# Patient Record
Sex: FEMALE | Race: WHITE | NOT HISPANIC OR LATINO | Employment: PART TIME | URBAN - METROPOLITAN AREA
[De-identification: names, ages, dates, MRNs, and addresses within clinical notes are randomized per-mention and may not be internally consistent; named-entity substitution may affect disease eponyms.]

---

## 2017-01-16 ENCOUNTER — ALLSCRIPTS OFFICE VISIT (OUTPATIENT)
Dept: OTHER | Facility: OTHER | Age: 27
End: 2017-01-16

## 2017-01-16 DIAGNOSIS — F41.8 OTHER SPECIFIED ANXIETY DISORDERS: ICD-10-CM

## 2017-01-16 DIAGNOSIS — E28.2 POLYCYSTIC OVARIAN SYNDROME: ICD-10-CM

## 2017-01-31 ENCOUNTER — ALLSCRIPTS OFFICE VISIT (OUTPATIENT)
Dept: OTHER | Facility: OTHER | Age: 27
End: 2017-01-31

## 2017-02-03 ENCOUNTER — GENERIC CONVERSION - ENCOUNTER (OUTPATIENT)
Dept: OTHER | Facility: OTHER | Age: 27
End: 2017-02-03

## 2017-02-15 ENCOUNTER — ALLSCRIPTS OFFICE VISIT (OUTPATIENT)
Dept: OTHER | Facility: OTHER | Age: 27
End: 2017-02-15

## 2017-03-09 ENCOUNTER — ALLSCRIPTS OFFICE VISIT (OUTPATIENT)
Dept: OTHER | Facility: OTHER | Age: 27
End: 2017-03-09

## 2017-03-09 DIAGNOSIS — Z31.41 ENCOUNTER FOR FERTILITY TESTING: ICD-10-CM

## 2017-03-09 DIAGNOSIS — N92.6 IRREGULAR MENSTRUATION: ICD-10-CM

## 2017-03-09 DIAGNOSIS — E28.2 POLYCYSTIC OVARIAN SYNDROME: ICD-10-CM

## 2017-03-21 LAB — PROGESTERONE LEVEL (HISTORICAL): <0.5 NG/ML

## 2017-04-18 ENCOUNTER — ALLSCRIPTS OFFICE VISIT (OUTPATIENT)
Dept: OTHER | Facility: OTHER | Age: 27
End: 2017-04-18

## 2017-08-02 ENCOUNTER — ALLSCRIPTS OFFICE VISIT (OUTPATIENT)
Dept: OTHER | Facility: OTHER | Age: 27
End: 2017-08-02

## 2017-10-31 ENCOUNTER — ALLSCRIPTS OFFICE VISIT (OUTPATIENT)
Dept: OTHER | Facility: OTHER | Age: 27
End: 2017-10-31

## 2017-10-31 ENCOUNTER — LAB REQUISITION (OUTPATIENT)
Dept: LAB | Facility: HOSPITAL | Age: 27
End: 2017-10-31
Payer: COMMERCIAL

## 2017-10-31 DIAGNOSIS — Z01.419 ENCOUNTER FOR GYNECOLOGICAL EXAMINATION WITHOUT ABNORMAL FINDING: ICD-10-CM

## 2017-10-31 PROCEDURE — G0145 SCR C/V CYTO,THINLAYER,RESCR: HCPCS | Performed by: OBSTETRICS & GYNECOLOGY

## 2017-11-01 NOTE — PROGRESS NOTES
Assessment  1  Encounter for routine gynecological examination (V72 31) (Z01 419)   2  Pap smear, as part of routine gynecological examination (V76 2) (Z01 419)   3  Polycystic ovarian syndrome (256 4) (E28 2)   4  Irregular menstrual bleeding (626 4) (N92 6)    Plan  Irregular menstrual bleeding    · Follow-up visit in 4 Months Evaluation and Treatment  Follow-up  Status: Hold For -  Scheduling  Requested for: 49RYQ1142   Ordered; For: Irregular menstrual bleeding; Ordered By: Hasmukh Moss Performed:  Due: 26JHC9351  Pap smear, as part of routine gynecological examination    · (1) THIN PREP PAP WITH IMAGING; Status: In Progress - Specimen/Data  Collected,Retrospective By Protocol Authorization;   Done: 03QLZ7284   Perform:[facility] In Office Collection; Order Comments:cervical, endocervical; Due:31Oct2018; Last Updated Fausto Denny; 10/31/2017 2:49:56 PM;Ordered; For:Pap smear, as part of routine gynecological examination; Ordered By:Tiffany Simmons; Maturation index required? : No  : 10/2017  HPV? : if ASCUS    Discussion/Summary  healthy adult female Currently, she eats a healthy diet  cervical cancer screening is current Pap test was done today Breast cancer screening: monthly self breast exam was advised  Advice and education were given regarding nutrition, aerobic exercise, calcium supplements, vitamin D supplements and sunscreen use  Normal GYN Exam  Stressed  ordered  SMear DOne  start on Low Dose BCP for period management  visit in 3 -4 months for evaluation    GYN Exam in 1 year  if any problems develop during the interim    The patient has the current Goals: [address]  The patent has the current Barriers: None  Patient is able to Self-Care  PATIENT EDUCATION RECORD She was given the following educational materials:  Ideas for a Healthy Life Style  The treatment plan was reviewed with the patient/guardian   The patient/guardian understands and agrees with the treatment plan Self Referrals: No      Chief Complaint  ANNUAL EXAM c/o irregular cycles      History of Present Illness  HPI: Periods are IRREGULAR Unpredictable when coming Probable PCOS C/o fatigue    Normal Bowel & Bladder habits  pelvic or abdominal pain   GYN , Adult Female [facility]: The patient is being seen for a gynecology evaluation  The last health maintenance visit was 1 year(s) ago  General Health: The patient's health since the last visit is described as good  She has regular dental visits  -- She denies vision problems  -- She denies hearing loss  Lifestyle:  She consumes a diverse and healthy diet  -- She does not have any weight concerns  -- She exercises regularly  -- She does not use tobacco -- She denies drug use  Reproductive health: the patient is premenopausal    Screening: cancer screening reviewed and current  metabolic screening reviewed and current  risk screening reviewed and current  Review of Systems    Constitutional: No fever, no chills, feels well, no tiredness, no recent weight gain or loss  ENT: no ear ache, no loss of hearing, no nosebleeds or nasal discharge, no sore throat or hoarseness  Cardiovascular: no complaints of slow or fast heart rate, no chest pain, no palpitations, no leg claudication or lower extremity edema  Respiratory: no complaints of shortness of breath, no wheezing, no dyspnea on exertion, no orthopnea or PND  Breasts: no complaints of breast pain, breast lump or nipple discharge  Gastrointestinal: no complaints of abdominal pain, no constipation, no nausea or diarrhea, no vomiting, no bloody stools  Genitourinary: no complaints of dysuria, no incontinence, no pelvic pain, no dysmenorrhea, no vaginal discharge or abnormal vaginal bleeding  Musculoskeletal: no complaints of arthralgia, no myalgia, no joint swelling or stiffness, no limb pain or swelling  Integumentary: no complaints of skin rash or lesion, no itching or dry skin, no skin wounds  Neurological: no complaints of headache, no confusion, no numbness or tingling, no dizziness or fainting  Active Problems  1  Allergic dermatitis (692 9) (L23 9)   2  Denied: History of Auditory hallucinations   3  Depression with anxiety (300 4) (F41 8)   4  Encounter for routine gynecological examination (V72 31) (Z01 419)   5  Family planning advice (V25 09) (Z30 09)   6  Fertility testing (V26 21) (Z31 41)   7  Fibrocystic breast changes, unspecified laterality (610 1) (N60 19)   8  Irregular menstrual bleeding (626 4) (N92 6)   9  Pap smear, as part of routine gynecological examination (V76 2) (Z01 419)   10  Paranoia (297 1) (F22)   11   Polycystic ovarian syndrome (256 4) (E28 2)    Past Medical History   · History of Abnormal uterine bleeding (AUB) (626 9) (N93 9)   · History of Ankle Injury (959 7)   · Denied: History of Auditory hallucinations   · History of abnormal cervical Pap smear (V13 29) (O92 520)   · History of headache (V13 89) (K63 711)   · History of pregnancy (V13 29)   · History of sinusitis (V12 69) (Z87 09)   · History of upper respiratory infection (V12 09) (Z87 09)   · History of viral infection (V12 09) (Z86 19)   · History of Immune to varicella (V49 89) (Z78 9)   · History of Menarche (V21 8)   · History of Oral herpes (054 2) (B00 2)    Surgical History   · History of Oral Surgery Tooth Extraction    Family History  Mother    · Family history of malignant neoplasm of thyroid (V16 8) (Z80 8)  Grandfather    · Family history of pancreatic cancer (V16 0) (Z80 0)  Paternal Grandfather    · Family history of colon cancer (V16 0) (Z80 0)  Family History    · Family history of Depression (311) (F32 9)   · Family history of breast disorder (V19 8) (Z84 2)   · Family history of malignant neoplasm (V16 9) (Z80 9)   · Family history of thyroid disease (V18 19) (Z80 46)    Social History   · Education history   · HIgh school graduate, Trade school certificate   · Exercise habits   · 2-3x/week   · Denied: History of drug use   ·    · Never A Smoker   · Occupation   ·    · [employer]   · Rarely consumes alcohol (V49 89) (Z78 9)    Current Meds   1  Escitalopram Oxalate 10 MG Oral Tablet; TAKE  1  TABLET Daily; Therapy: 19BKI1503 to (Evaluate:17Rbj8023)  Requested for: 88TJB0522; Last   Rx:29Ljy0723 Ordered    Allergies  1  Penicillins    Vitals   Recorded: 41HVD6046 44:83CD   Systolic 118   Diastolic 64   Height 5 ft 6 5 in   Weight 226 lb    BMI Calculated 35 93   BSA Calculated 2 12   LMP 01Oct2017     Physical Exam    Constitutional   General appearance: No acute distress, well appearing and well nourished  Neck   Neck: Normal, supple, trachea midline, no masses  Thyroid: Normal, no thyromegaly  Pulmonary   Respiratory effort: No increased work of breathing or signs of respiratory distress  Auscultation of lungs: Clear to auscultation  Cardiovascular   Auscultation of heart: Normal rate and rhythm, normal S1 and S2, no murmurs  Peripheral vascular exam: Normal pulses Throughout  Genitourinary   External genitalia: Normal and no lesions appreciated  Vagina: Normal, no lesions or dryness appreciated  Urethra: Normal     Urethral meatus: Normal     Bladder: Normal, soft, non-tender and no prolapse or masses appreciated  Cervix: Normal, no palpable masses  Uterus: Normal, non-tender, not enlarged, and no palpable masses  Adnexa/parametria: Normal, non-tender and no fullness or masses appreciated  Chest   Breasts: Normal and no dimpling or skin changes noted  Abdomen   Abdomen: Normal, non-tender, and no organomegaly noted  Liver and spleen: No hepatomegaly or splenomegaly  Examination for hernias: No hernias appreciated  Lymphatic   Palpation of lymph nodes in neck, axillae, groin and/or other locations: No lymphadenopathy or masses noted  Skin   Skin and subcutaneous tissue: Normal skin turgor and no rashes  Palpation of skin and subcutaneous tissue: Normal     Psychiatric   Orientation to person, place, and time: Normal     Mood and affect: Normal        Provider Comments  Provider Comments:   Wants to go to [facility] for Dental Hygiene      Signatures   Electronically signed by : DIONTE Morrison ; Oct 31 2017  3:44PM EST                       (Author)

## 2017-11-07 LAB
LAB AP GYN PRIMARY INTERPRETATION: NORMAL
Lab: NORMAL

## 2017-12-20 ENCOUNTER — GENERIC CONVERSION - ENCOUNTER (OUTPATIENT)
Dept: OTHER | Facility: OTHER | Age: 27
End: 2017-12-20

## 2017-12-27 ENCOUNTER — ALLSCRIPTS OFFICE VISIT (OUTPATIENT)
Dept: OTHER | Facility: OTHER | Age: 27
End: 2017-12-27

## 2017-12-28 NOTE — PROGRESS NOTES
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 or persist     Possible side effects of new medications were reviewed with the patient/guardian today  The treatment plan was reviewed with the patient/guardian  The patient/guardian understands and agrees with the treatment plan       Self Referrals: No      Chief Complaint   Patient had vomiting and diarrhea, she does feel better but needs a note to return to work      Advance Directives   Advance Directive [facility]:      NO - Patient does not have an advance health care directive  History of Present Illness   HPI: Pt presents today for an acute visit    vomiting and diarrhea which started yesterday morning  vomiting over night, once this morning having diarrhea - about 4 episodes this morning blood or mucus in stool  is nonbloody and nonbilious  eating much but is drinking fluids and keeping them down  lower abdominal cramping yesterday, resolved today  chills yesterday and felt feverish  around family members with similar symptoms over the holiday  rhinorrhea, nasal congestion, sore throat, coughing, headache, body aches      Review of Systems        Constitutional: chills-- and-- feeling tired, but-- as noted in HPI-- and-- no fever  ENT: no earache,-- no sore throat-- and-- no nasal discharge        Cardiovascular: no chest pain,-- no intermittent leg claudication,-- no palpitations-- and-- no lower extremity edema  Respiratory: no shortness of breath,-- no cough,-- no wheezing-- and-- no shortness of breath during exertion  Gastrointestinal: nausea,-- vomiting-- and-- diarrhea, but-- as noted in HPI,-- no abdominal pain-- and-- no constipation  Genitourinary: no dysuria  Musculoskeletal: no myalgias  Integumentary: no rashes  Neurological: no headache-- and-- no dizziness  ROS reviewed  Active Problems   1  Allergic dermatitis (692 9) (L23 9)   2  Denied: History of Auditory hallucinations   3  Depression with anxiety (300 4) (F41 8)   4  Encounter for routine gynecological examination (V72 31) (Z01 419)   5  Family planning advice (V25 09) (Z30 09)   6  Fertility testing (V26 21) (Z31 41)   7  Fibrocystic breast changes, unspecified laterality (610 1) (N60 19)   8  Irregular menstrual bleeding (626 4) (N92 6)   9  Pap smear, as part of routine gynecological examination (V76 2) (Z01 419)   10  Paranoia (297 1) (F22)   11  Polycystic ovarian syndrome (256 4) (E28 2)    Past Medical History   1  History of Abnormal uterine bleeding (AUB) (626 9) (N93 9)   2  History of Ankle Injury (959 7)   3  Denied: History of Auditory hallucinations   4  History of abnormal cervical Pap smear (V13 29) (Z87 898)   5  History of headache (V13 89) (Z87 898)   6  History of pregnancy (V13 29)   7  History of sinusitis (V12 69) (Z87 09)   8  History of upper respiratory infection (V12 09) (Z87 09)   9  History of viral infection (V12 09) (Z86 19)   10  History of Immune to varicella (V49 89) (Z78 9)   11  History of Menarche (V21 8)   12  History of Oral herpes (054 2) (B00 2)  Active Problems And Past Medical History Reviewed: The active problems and past medical history were reviewed and updated today  Family History   Mother    1  Family history of malignant neoplasm of thyroid (V16 8) (Z80 8)  Grandfather    2   Family history of pancreatic cancer (V16 0) (Z80 0)  Paternal Grandfather    3  Family history of colon cancer (V16 0) (Z80 0)  Family History    4  Family history of Depression (311) (F32 9)   5  Family history of breast disorder (V19 8) (Z84 2)   6  Family history of malignant neoplasm (V16 9) (Z80 9)   7  Family history of thyroid disease (V18 19) (Z80 46)    Social History    · Education history   · HIgh school graduate, Trade school certificate   · Exercise habits   · 2-3x/week   · Denied: History of drug use   ·    · Never A Smoker   · Occupation   · [address]   · 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   · Rarely consumes alcohol (V49 89) (Z78 9)  The social history was reviewed and updated today  The social history was reviewed and is unchanged  Surgical History   1  History of Oral Surgery Tooth Extraction    Current Meds    1  Escitalopram Oxalate 10 MG Oral Tablet; TAKE  1  TABLET Daily; Therapy: 50FJT5860 to [id number])  Requested for: 52NCO3182; Last     Rx:01Hmi4000 Ordered     The medication list was reviewed and updated today  Allergies   1  Penicillins    Vitals    Recorded: 82KFT8224 11:31AM   Temperature 97 3 F   Heart Rate 88   Respiration 20   Systolic 647   Diastolic 78   Height 5 ft 6 5 in   Weight 221 lb    BMI Calculated 35 14   BSA Calculated 2 1     Physical Exam        Constitutional      General appearance: No acute distress, well appearing and well nourished  Eyes      Conjunctiva and lids: No swelling, erythema or discharge  Pupils and irises: Equal, round and reactive to light  Ears, Nose, Mouth, and Throat      External inspection of ears and nose: Normal        Otoscopic examination: Tympanic membranes translucent with normal light reflex  Canals patent without erythema  Nasal mucosa, septum, and turbinates: Normal without edema or erythema  Oropharynx: Normal with no erythema, edema, exudate or lesions    Oral mucosa was moist, but-- was normal       Pulmonary      Respiratory effort: No increased work of breathing or signs of respiratory distress  Auscultation of lungs: Clear to auscultation  Cardiovascular      Auscultation of heart: Normal rate and rhythm, normal S1 and S2, without murmurs  Examination of extremities for edema and/or varicosities: Normal        Abdomen      Abdomen: Abnormal   The abdomen was obese  Bowel sounds were hyperactive  The abdomen was soft and nontender  The abdomen was not firm-- and-- not rigid  No rebound tenderness  No guarding  There was a negative Gould's sign-- and-- negative psoas sign  no masses palpated  The abdomen was normal to percussion-- and-- did not have ascites  Lymphatic      Palpation of lymph nodes in neck: No lymphadenopathy  Musculoskeletal      Gait and station: Normal        Skin      Skin and subcutaneous tissue: Normal without rashes or lesions  Psychiatric      Orientation to person, place, and time: Normal        Mood and affect: Normal           Attending Note   Collaborating Physician Note: Collaborating Note: I did not interview and examine the patient-- and-- I agree with the Advanced Practitioner note  Future Appointments      Date/Time Provider Specialty Site   01/23/2018 02:30 PM DIONTE Isaac  Obstetrics/Gynecology [place] [address] [facility]     Signatures    Electronically signed by : LACY Esquivel; Dec 27 2017 12:09PM EST                       (Author)     Electronically signed by :  Edwin Akhtar MD; Dec 27 2017 12:15PM EST                       (Author)

## 2018-01-10 NOTE — PSYCH
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 [employer]  Patient verbal and cooperative  HPI - Psych: Patient has had some increased anxiety associated with 's work travel/stressors  Has some stress in workplace but managing  Denies any depressive symptoms  No SI  Has history of paranoia and some auditory hallucinations  Has always managed in hr own outside of treatment  Details no history of hospitalization  Thoughts logical and goal-directed  Oriented to person, place and time   Note   Note:   Given her struggles and her family history, she agrees to referral for psychiatrist via [employer]  Completed referral  Provided my contact information if needed going forward  Feels she does not need counseling but will call if this changes  Assessment    1   Depression with anxiety (300 4) (F41 8)    Signatures   Electronically signed by : Rosalba Hernandez LCSW; Jan 31 2017  1:43PM EST                       (Author)

## 2018-01-11 NOTE — MISCELLANEOUS
Message  Spoke with patient regarding blood work and pelvic U/S results  Ovaries enlarged on U/S but no cysts  Per Dr Natasha Lamb, blood work suspicious for PCOS, particularly elevated testosterone  Explained PCOS to patient and answered her questions  Discussed possible treatment options including OCPs, and possibility of referral to fertility specialist  Encouraged patient to continue trying to conceive  Has follow-up appt in August Signatures   Electronically signed by : Juan Allen [facility];  Apr 22 2016 10:15AM EST                       (Author)

## 2018-01-13 VITALS
BODY MASS INDEX: 34.26 KG/M2 | HEIGHT: 67 IN | SYSTOLIC BLOOD PRESSURE: 120 MMHG | TEMPERATURE: 98.2 F | WEIGHT: 218.25 LBS | DIASTOLIC BLOOD PRESSURE: 60 MMHG | RESPIRATION RATE: 16 BRPM | HEART RATE: 76 BPM

## 2018-01-13 VITALS
WEIGHT: 226 LBS | BODY MASS INDEX: 35.47 KG/M2 | SYSTOLIC BLOOD PRESSURE: 116 MMHG | DIASTOLIC BLOOD PRESSURE: 64 MMHG | HEIGHT: 67 IN

## 2018-01-14 VITALS
BODY MASS INDEX: 34.57 KG/M2 | WEIGHT: 220.25 LBS | TEMPERATURE: 97.8 F | DIASTOLIC BLOOD PRESSURE: 84 MMHG | HEART RATE: 80 BPM | HEIGHT: 67 IN | RESPIRATION RATE: 16 BRPM | SYSTOLIC BLOOD PRESSURE: 122 MMHG

## 2018-01-14 VITALS
DIASTOLIC BLOOD PRESSURE: 78 MMHG | HEIGHT: 67 IN | BODY MASS INDEX: 35.44 KG/M2 | TEMPERATURE: 97.4 F | WEIGHT: 225.8 LBS | HEART RATE: 80 BPM | RESPIRATION RATE: 16 BRPM | SYSTOLIC BLOOD PRESSURE: 132 MMHG

## 2018-01-15 NOTE — PSYCH
Behavioral Health Outpatient Intake    Referred By: Isaiah Pablo  Intake Questions: there are no developmental disabilities  the patient does not have a hearing impairment  the patient does not have an ICM or CTT  patient is not taking injectable psychiatric medications  Employment: The patient is employed part time at [address]  Emergency Contact Information:   Emergency Contact: Rivka Prado   Relationship to Patient:    Phone Number: [phone]   Previous Psychiatric Treatment: She has not been previously seen by a psychiatrist     She has not been previously seen by a therapist     History: no  service  She has not had combat service  She was not activated into federal active duty as a member of the [employer] or [employer]  [facility]   : 3-88   Employer: [address] [place] [address]   Primary Insurance: [facility]   ID number: [id number]   Group number: [id number]         Presenting Problem (in patient's words): AXNIETY  Substance Abuse: NONE  Previous Treatment: The patient has not been seen here in the past      Accepted as Patient    [facility] @ 9:00     Primary Care Physician: [facility]       Signatures   Electronically signed by : Toyin Lucero, ; Feb  3 2017  8:59AM EST                       (Author)

## 2018-01-15 NOTE — PSYCH
Provider Comments  Provider Comments:   Patient was no-show for initial evaluation        Signatures   Electronically signed by : Adeline Catherine MD; Apr 18 2017  9:30AM EST                       (Author)

## 2018-01-16 ENCOUNTER — ALLSCRIPTS OFFICE VISIT (OUTPATIENT)
Dept: OTHER | Facility: OTHER | Age: 28
End: 2018-01-16

## 2018-01-16 NOTE — MISCELLANEOUS
Message  Spoke with patient regarding CBC - WNL  Patient states she started OCP the day of her visit on 5/12, and bleeding stopped  No issues since  Has appt for follow-up on 8/4        Signatures   Electronically signed by : Peter Lr, [facility]; May 19 2016  2:34PM EST                       (Author)

## 2018-01-17 NOTE — PROGRESS NOTES
Chief Complaint   Chief Complaint Free Text Note Form: Contraceptive Visit f/u feel very comfortable with her b/c,period are super regular and light      History of Present Illness   HPI: BCP CHECK-UP     Patient doing very well on the BCP     No abnormal symptoms noted     Periods are very REGULAR No abnormal bleeding noted     Denies any significant cramping either     Very Happy! Review of Systems   Focused-Female:      Constitutional: No fever, no chills, feels well, no tiredness, no recent weight gain or loss  ENT: no ear ache, no loss of hearing, no nosebleeds or nasal discharge, no sore throat or hoarseness  Cardiovascular: no complaints of slow or fast heart rate, no chest pain, no palpitations, no leg claudication or lower extremity edema  Respiratory: no complaints of shortness of breath, no wheezing, no dyspnea on exertion, no orthopnea or PND  Breasts: no complaints of breast pain, breast lump or nipple discharge  Gastrointestinal: no complaints of abdominal pain, no constipation, no nausea or diarrhea, no vomiting, no bloody stools  Genitourinary: no complaints of dysuria, no incontinence, no pelvic pain, no dysmenorrhea, no vaginal discharge or abnormal vaginal bleeding  Musculoskeletal: no complaints of arthralgia, no myalgia, no joint swelling or stiffness, no limb pain or swelling  Integumentary: no complaints of skin rash or lesion, no itching or dry skin, no skin wounds  Neurological: no complaints of headache, no confusion, no numbness or tingling, no dizziness or fainting  Active Problems   1  Allergic dermatitis (692 9) (L23 9)   2  Denied: History of Auditory hallucinations   3  Depression with anxiety (300 4) (F41 8)   4  Encounter for contraceptive management (V25 9) (Z30 9)   5  Encounter for routine gynecological examination (V72 31) (Z01 419)   6  Family planning advice (V25 09) (Z30 09)   7  Fertility testing (V26 21) (Z31 41)   8  Fibrocystic breast changes, unspecified laterality (610 1) (N60 19)   9  Irregular menstrual bleeding (626 4) (N92 6)   10  Pap smear, as part of routine gynecological examination (V76 2) (Z01 419)   11  Paranoia (297 1) (F22)   12  Polycystic ovarian syndrome (256 4) (E28 2)   13  Viral gastroenteritis (008 8) (A08 4)    Past Medical History   1  History of Abnormal uterine bleeding (AUB) (626 9) (N93 9)   2  History of Ankle Injury (959 7)   3  Denied: History of Auditory hallucinations   4  History of abnormal cervical Pap smear (V13 29) (Z87 898)   5  History of headache (V13 89) (Z87 898)   6  History of pregnancy (V13 29)   7  History of sinusitis (V12 69) (Z87 09)   8  History of upper respiratory infection (V12 09) (Z87 09)   9  History of viral infection (V12 09) (Z86 19)   10  History of Immune to varicella (V49 89) (Z78 9)   11  History of Menarche (V21 8)   12  History of Oral herpes (054 2) (B00 2)    Surgical History   1  History of Oral Surgery Tooth Extraction    Family History   Mother    1  Family history of malignant neoplasm of thyroid (V16 8) (Z80 8)  Grandfather    2  Family history of pancreatic cancer (V16 0) (Z80 0)  Paternal Grandfather    3  Family history of colon cancer (V16 0) (Z80 0)  Family History    4  Family history of Depression (311) (F32 9)   5  Family history of breast disorder (V19 8) (Z84 2)   6  Family history of malignant neoplasm (V16 9) (Z80 9)   7  Family history of thyroid disease (V18 19) (Z80 46)    Social History    · Education history   · Exercise habits   · Denied: History of drug use   ·    · Never A Smoker   · Occupation   · [address]   · Rarely consumes alcohol (V49 89) (Z78 9)    Current Meds    1  Escitalopram Oxalate 10 MG Oral Tablet; TAKE  1  TABLET Daily; Therapy: 59WCW3280 to (REXQNSMS:03FZJ0159)  Requested for: 21BSQ6358; Last     Rx:[id number] Ordered   2   Ondansetron HCl - 4 MG Oral Tablet; TAKE 1 TABLET BY MOUTH EVERY 6 TO 8 HOURS AS NEEDED FOR NAUSEA AND VOMITING; Therapy: 96NNB8830 to (Evaluate:06Jan2018)  Requested for: 59Sus2256; Last     Rx:[id number] Ordered    Allergies   1  Penicillins    Vitals   Vital Signs    Recorded: 55MBS8592 50:61YS   Systolic 919   Diastolic 70   Height 5 ft 6 in   Weight 224 lb 2 oz   BMI Calculated 36 17   BSA Calculated 2 1   LMP [id number]     Physical Exam        Constitutional      General appearance: No acute distress, well appearing and well nourished  Skin      Skin and subcutaneous tissue: Normal skin turgor and no rashes  Psychiatric      Orientation to person, place, and time: Normal        Mood and affect: Normal        Assessment   1  Encounter for contraceptive management (V25 9) (Z30 9)    Plan   1  Taytulla 1-20 MG-MCG(24) Oral Capsule; take 1 capsule daily  2  Follow-up Visit in 8 Months Evaluation and Treatment  Follow-up  Status: Hold For -     Scheduling  Requested for: 78CZD5773    Discussion/Summary   Discussion Summary:    TOPIC: BCP FOLLOW-UP     Patient doing very well on the BCP     Will refill until the end of the year     Follow-up visit in October for her ANNUAL Visit     All questions were answered for the patient     She was told to javed if any problems develop during the interim  Counseling Documentation With Imm: The patient was counseled regarding diagnostic results,-- instructions for management,-- risk factor reductions,-- prognosis,-- patient and family education,-- impressions,-- risks and benefits of treatment options,-- importance of compliance with treatment  total time of encounter was 15 minutes-- and-- 15 minutes was spent counseling  Contraception: Impression: contraception management  Currently, the patient has satisfactory contraception, adequate compliance and symptoms that are well controlled  There are no changes in medication management  Patient discussion: discussed with the patient  Goals and Barriers:  The patient has the current Goals: BCP MANAGEMENT  The patent has the current Barriers: None  Patient's Capacity to Self-Care: Patient is able to Self-Care  Patient Education: Discuss BCP EFFICACY      Medication SE Review and Pt Understands Tx: The treatment plan was reviewed with the patient/guardian   The patient/guardian understands and agrees with the treatment plan    Self Referrals:    Self Referrals: No      Signatures    Electronically signed by : DIONTE Lacy ; Jan 16 2018  9:49AM EST                       (Author)

## 2018-01-17 NOTE — PROGRESS NOTES
Assessment    1  Polycystic ovarian syndrome (256 4) (E28 2)   2  Family planning advice (V25 09) (Z30 09)   3  Fertility testing (V26 21) (Z31 41)   4  Irregular menstrual bleeding (626 4) (N92 6)    Plan  Family planning advice    · Timed intercourse on cycle days 10, 12, 14, 16, 18; Status:Complete;   Done: 74MRJ8555   Ordered; For:Family planning advice; Ordered By:Nayely Coe;   · You can use a home ovulation predictor kit to help you know when you ovulate ;  Status:Complete;   Done: 36IFD5376   Ordered; For:Family planning advice; Ordered By:Nayely Coe;  Fertility testing    · (1) SEMEN ANALYSIS COMPLETE; Status:Active; Requested UCR:[id number];    Perform:[facility]; Order Comments:: Yadi Gamino--: 3/25/1986; [id number]; Ordered; For:Fertility testing; Ordered By:Nayely Coe;   · FL HYSTEROSALPINGOGRAM; Status:Hold For - Scheduling; Requested [id number];     Perform:[facility]; WOT:[id number]; Ordered; For:Fertility testing; Ordered By:Rach Coe;  Irregular menstrual bleeding, Polycystic ovarian syndrome    · MedroxyPROGESTERone Acetate 10 MG Oral Tablet (Provera); if no menses x 6  weeks, take a pregnancy test, if negative take one tablet daily x 10 days   Rx By: Phil Quintana; Dispense: 0 Days ; #:10 Tablet; Refill: 2; For: Irregular menstrual bleeding, Polycystic ovarian syndrome; LIVIA = N; Verified Transmission to [facility] #[id number]; Last Updated By: System, [employer]; 3/9/2017 10:14:51 AM   · (1) PROGESTERONE; Status:Active; Requested for:2017;    Perform:[facility]; Due:2018; Ordered; For:Irregular menstrual bleeding, Polycystic ovarian syndrome; Ordered By:Nayely Coe;    Chief Complaint  Chief Complaint Free Text Note Form: I would like a second opinion/consultation      History of Present Illness  HPI: Pt is a 24 y o  Martín Thomas who presents for consultation secondary to secondary infertility   She reports 2 years of infertility  She reports she was diagnosed with PCOS by Dr Alban Primrose  She reports that she was told to see [facility]  She reports she had a minimal workup  THe pt reports she has irregular menses  She reports she has intercourse with her  irregularly because he travels across the country for work  She reports when he is home that they have intercourse 3-4x/week  She reports she has not done any ovulation detection kits  They have not had semen analysis or HSG  Pt not taking PNV  Reviewed patient's records--LH/FSH/CBC/Estradiol/TSH wnl  Testosterone is elevated  Pap 4/2016--wnl  Pt has not had progesterone level  Pt advised to start PNV, use ovulation detection kits, timed intercourse, cyclic Provera  Additionally will do HSG next cycle  Rx for semen analysis given  If no ovulation and remainder of testing wnl, consider Clomid  Pt agreeable with plan  Review of Systems  Focused-Female:   Constitutional: No fever, no chills, feels well, no tiredness, no recent weight gain or loss  ENT: no ear ache, no loss of hearing, no nosebleeds or nasal discharge, no sore throat or hoarseness  Cardiovascular: no complaints of slow or fast heart rate, no chest pain, no palpitations, no leg claudication or lower extremity edema  Respiratory: no complaints of shortness of breath, no wheezing, no dyspnea on exertion, no orthopnea or PND  Breasts: no complaints of breast pain, breast lump or nipple discharge  Gastrointestinal: no complaints of abdominal pain, no constipation, no nausea or diarrhea, no vomiting, no bloody stools  Genitourinary: as noted in HPI  Musculoskeletal: no complaints of arthralgia, no myalgia, no joint swelling or stiffness, no limb pain or swelling  Integumentary: no complaints of skin rash or lesion, no itching or dry skin, no skin wounds  Neurological: no complaints of headache, no confusion, no numbness or tingling, no dizziness or fainting  Active Problems    1   Denied: History of Auditory hallucinations   2  Depression with anxiety (300 4) (F41 8)   3  Encounter for routine gynecological examination (V72 31) (Z01 419)   4  Family planning advice (V25 09) (Z30 09)   5  Fertility testing (V26 21) (Z31 41)   6  Fibrocystic breast changes, unspecified laterality (610 1) (N60 19)   7  Irregular menstrual bleeding (626 4) (N92 6)   8  Pap smear, as part of routine gynecological examination (V76 2) (Z01 419)   9  Paranoia (297 1) (F22)   10  Polycystic ovarian syndrome (256 4) (E28 2)    Past Medical History    1  History of Abnormal uterine bleeding (AUB) (626 9) (N93 9)   2  History of Ankle Injury (959 7)   3  Denied: History of Auditory hallucinations   4  History of abnormal cervical Pap smear (V13 29) (Z87 898)   5  History of headache (V13 89) (Z87 898)   6  History of pregnancy (V13 29)   7  History of sinusitis (V12 69) (Z87 09)   8  History of upper respiratory infection (V12 09) (Z87 09)   9  History of viral infection (V12 09) (Z86 19)   10  History of Immune to varicella (V49 89) (Z78 9)   11  History of Menarche (V21 8)   12  History of Oral herpes (054 2) (B00 2)  Active Problems And Past Medical History Reviewed: The active problems and past medical history were reviewed and updated today  Surgical History    1  History of Oral Surgery Tooth Extraction  Surgical History Reviewed: The surgical history was reviewed and updated today  Family History  Mother    1  Family history of malignant neoplasm of thyroid (V16 8) (Z80 8)  Grandfather    2  Family history of pancreatic cancer (V16 0) (Z80 0)  Paternal Grandfather    3  Family history of colon cancer (V16 0) (Z80 0)  Family History    4  Family history of Depression (311) (F32 9)   5  Family history of breast disorder (V19 8) (Z84 2)   6  Family history of malignant neoplasm (V16 9) (Z80 9)   7  Family history of thyroid disease (V18 19) (Z83 49)  Family History Reviewed:    The family history was reviewed and updated today  Social History    · Education history   · Exercise habits   · Denied: History of drug use   ·    · Never A Smoker   · Occupation   · [address]   · Rarely consumes alcohol (V49 89) (Z78 9)  Social History Reviewed: The social history was reviewed and updated today  The social history was reviewed and is unchanged  Current Meds   1  Escitalopram Oxalate 10 MG Oral Tablet; take  1 tab  daily; Therapy: 21BGT4678 to (Last Rx:87Uvh1878)  Requested for: 98SQC1880 Ordered  Medication List Reviewed: The medication list was reviewed and updated today  Allergies    1  Penicillins    Vitals  Vital Signs    Recorded: 27CUM7948 67:94GD   Systolic 264   Diastolic 84   Height 5 ft 6 5 in   Weight 216 lb    BMI Calculated 34 34   BSA Calculated 2 08   LMP [id number]     Physical Exam    Constitutional   General appearance: Abnormal   obese  Neck   Neck: Normal, supple, trachea midline, no masses  Thyroid: Normal, no thyromegaly  Pulmonary   Respiratory effort: No increased work of breathing or signs of respiratory distress  Auscultation of lungs: Clear to auscultation  Cardiovascular   Auscultation of heart: Normal rate and rhythm, normal S1 and S2, no murmurs  Genitourinary   External genitalia: Normal and no lesions appreciated  Vagina: Abnormal   Vagina: moderate bleeding, but normal    Urethra: Normal     Urethral meatus: Normal     Bladder: Normal, soft, non-tender and no prolapse or masses appreciated  Cervix: Normal, no palpable masses  Examination of the cervix revealed normal findings, no polyps and no masses  Uterus: Normal, non-tender, not enlarged, and no palpable masses  Adnexa/parametria: Normal, non-tender and no fullness or masses appreciated  Abdomen   Abdomen: Abnormal   The abdomen was obese  Liver and spleen: No hepatomegaly or splenomegaly  Examination for hernias: No hernias appreciated      Skin hirsutism noted on chin, chest and breasts  Psychiatric   Orientation to person, place, and time: Normal     Mood and affect: Normal        Future Appointments    Date/Time Provider Specialty Site   04/18/2017 09:00 AM Gabriela Daniels MD Psychiatry  [facility]   04/06/2017 08:30 AM DIONTE Whiteside   Obstetrics/Gynecology  [address] [facility]   05/17/2017 10:00 AM Hamilton Alcantara E Jonelle Caal     Signatures   Electronically signed by : DIONTE Posada ; Mar  9 2017 10:26AM EST                       (Author)

## 2018-01-22 VITALS
WEIGHT: 216 LBS | SYSTOLIC BLOOD PRESSURE: 120 MMHG | BODY MASS INDEX: 33.9 KG/M2 | DIASTOLIC BLOOD PRESSURE: 84 MMHG | HEIGHT: 67 IN

## 2018-01-23 VITALS
BODY MASS INDEX: 34.69 KG/M2 | HEIGHT: 67 IN | WEIGHT: 221 LBS | DIASTOLIC BLOOD PRESSURE: 78 MMHG | TEMPERATURE: 97.3 F | HEART RATE: 88 BPM | SYSTOLIC BLOOD PRESSURE: 122 MMHG | RESPIRATION RATE: 20 BRPM

## 2018-01-23 VITALS
SYSTOLIC BLOOD PRESSURE: 110 MMHG | BODY MASS INDEX: 36.02 KG/M2 | WEIGHT: 224.13 LBS | HEIGHT: 66 IN | DIASTOLIC BLOOD PRESSURE: 70 MMHG

## 2018-01-23 NOTE — MISCELLANEOUS
Message  Return to work or school:   Cristal Felix is under my professional care  She was seen in my office on 12/27/2017   She is able to return to work on   12/29/2017     Patrick HOUSE/          Signatures   Electronically signed by : LACY Marie; Dec 27 2017 12:01PM EST                       (Author)

## 2018-01-23 NOTE — MISCELLANEOUS
Message  Return to work or school:    She is able to return to work on  12/22/2017      Son seen in our office today, 12/20/17  DIONTE Riddle /ortega  Signatures   Electronically signed by :  Sancho Oliveros MD; Dec 20 2017  3:10PM EST                       (Author)

## 2019-01-07 ENCOUNTER — OFFICE VISIT (OUTPATIENT)
Dept: OBGYN CLINIC | Facility: CLINIC | Age: 29
End: 2019-01-07
Payer: COMMERCIAL

## 2019-01-07 VITALS — WEIGHT: 234.4 LBS | SYSTOLIC BLOOD PRESSURE: 118 MMHG | DIASTOLIC BLOOD PRESSURE: 70 MMHG | BODY MASS INDEX: 37.83 KG/M2

## 2019-01-07 DIAGNOSIS — N93.9 ABNORMAL UTERINE BLEEDING (AUB): Primary | ICD-10-CM

## 2019-01-07 DIAGNOSIS — E28.2 POLYCYSTIC OVARIAN SYNDROME: ICD-10-CM

## 2019-01-07 PROCEDURE — 99213 OFFICE O/P EST LOW 20 MIN: CPT | Performed by: OBSTETRICS & GYNECOLOGY

## 2019-01-07 RX ORDER — ALPRAZOLAM 0.25 MG/1
0.25 TABLET ORAL
COMMUNITY
Start: 2018-10-08 | End: 2019-04-06

## 2019-01-07 RX ORDER — ESCITALOPRAM OXALATE 20 MG/1
20 TABLET ORAL DAILY
Refills: 3 | COMMUNITY
Start: 2018-12-02

## 2019-01-07 RX ORDER — METFORMIN HYDROCHLORIDE 750 MG/1
750 TABLET, EXTENDED RELEASE ORAL
COMMUNITY
Start: 2018-12-04 | End: 2020-05-27

## 2019-01-07 NOTE — PROGRESS NOTES
Assessment/Plan:    Assessment:  Discussion of polycystic ovarian syndrome and abnormal uterine bleeding    Plan: We will obtain a complete blood panel for the patient as well as imaging studies via pelvic ultrasound    Follow-up visit will be scheduled for several weeks to discuss results of the above as well as to devise a treatment plan    All questions were answered in detail for patient at today's visit    Patient will call should any problems issues or concerns arise      No problem-specific Assessment & Plan notes found for this encounter  Diagnoses and all orders for this visit:    Abnormal uterine bleeding (AUB)  -     CBC and differential; Future  -     TSH Stimulation; Future  -     T4, free; Future  -     Testosterone; Future  -     Luteinizing hormone; Future  -     Follicle stimulating hormone; Future  -     DHEA-sulfate; Future    Polycystic ovarian syndrome    Other orders  -     escitalopram (LEXAPRO) 20 mg tablet; Take 20 mg by mouth daily  -     metFORMIN (GLUCOPHAGE-XR) 750 mg 24 hr tablet; Take 750 mg by mouth  -     ALPRAZolam (XANAX) 0 25 mg tablet; Take 0 25 mg by mouth        Subjective:      Patient ID: Carolina Barros is a 34 y o  female  Patient is a 61-year-old female who presents today for discussion regarding abnormal uterine bleeding    Patient reports that for the past several months she has been having almost continuous vaginal bleeding  She states that every so often it decreases markedly and there may be several days with no flow but for the most part for the last 2-3 cycles she has bled fairly continuously    She also describes moderate cramping as well    She denies any headaches at all    Other than the cramping, she denies any significant pelvic or abdominal pain however    She reports fairly normal bowel and bladder habits    Diagnosis of polycystic ovary had been made in the past and her family physician had started her on metformin however she was not able to tolerated that well because of GI side effects    We discussed the pathophysiology of polycystic ovary syndrome today and I explained to her how it can affect the menstrual cycle as well as overall symptomatology as well    I suggested that we repeat lab values at this time including a CBC, thyroid functions and a polycystic ovary profile    In addition we will repeat her pelvic ultrasound to evaluate pelvic structures including stripe in ovarian status    She is not particularly interested in becoming pregnant at this time and pending results will consider putting her on a low-dose oral contraceptive in order to achieve some menstrual regularity for her    All questions were answered for her during today's visit    I will see her back in several weeks after all lab studies and ultrasound imaging is completed to discuss treatment plan    Patient will call should any problems issues or concerns arise during the interim    Total time spent at today's visit was 20 minutes of which greater than 50% was spent face-to-face and counseling and coordination of care            The following portions of the patient's history were reviewed and updated as appropriate: allergies, current medications, past family history, past medical history, past social history, past surgical history and problem list     Review of Systems   Constitutional: Negative  HENT: Negative  Eyes: Negative  Respiratory: Negative  Cardiovascular: Negative  Gastrointestinal: Negative  Endocrine: Negative  Genitourinary: Negative  Musculoskeletal: Negative  Skin: Negative  Neurological: Negative  Psychiatric/Behavioral: Negative  Objective:      /70   Wt 106 kg (234 lb 6 4 oz)   LMP 09/12/2018   BMI 37 83 kg/m²          Physical Exam   Constitutional: She is oriented to person, place, and time  She appears well-developed and well-nourished  HENT:   Head: Normocephalic and atraumatic     Eyes: Pupils are equal, round, and reactive to light  Neck: Normal range of motion  Pulmonary/Chest: Effort normal    Musculoskeletal: Normal range of motion  Neurological: She is alert and oriented to person, place, and time  Psychiatric: She has a normal mood and affect   Her behavior is normal  Judgment and thought content normal

## 2019-01-07 NOTE — PATIENT INSTRUCTIONS
Topic:  Probable polycystic ovarian syndrome and abnormal uterine bleeding    We discussed the pathophysiology of polycystic ovarian syndrome as well as the causes of abnormal uterine bleeding    In light of the persistence of the bleeding, we will obtain a new lb All of laboratory values as well as a pelvic ultrasound for imaging    We will then meet for discussion of a treatment plan after all studies are completed    All questions were answered in detail for the patient at today's visit    Patient will call should any problems issues or concerns arise

## 2019-01-10 ENCOUNTER — ULTRASOUND (OUTPATIENT)
Dept: OBGYN CLINIC | Facility: CLINIC | Age: 29
End: 2019-01-10
Payer: COMMERCIAL

## 2019-01-10 DIAGNOSIS — N93.9 ABNORMAL UTERINE BLEEDING (AUB): Primary | ICD-10-CM

## 2019-01-10 PROCEDURE — 76856 US EXAM PELVIC COMPLETE: CPT | Performed by: OBSTETRICS & GYNECOLOGY

## 2019-01-10 NOTE — PROGRESS NOTES
AMB US Pelvic Non OB  Date/Time: 1/10/2019 3:30 PM  Performed by: Kory Damon by: Marquita Rush     Procedure details:     Indications: ovarian cysts, non-obstetric vaginal bleeding and intermenstrual blood loss      Technique:  US Pelvic, Non-OB with complete exam  Uterine findings:     Diameter (mm):  91    Length (mm):  56    Width (mm):  41    Uterine adhesions: not identified      Adnexal mass: not identified      Polyps: not identified      Myomas: not identified      Endometrial stripe: identified      Endometrium thickness (mm):  9  Left ovary findings:     Left ovary:  Visualized    Cysts: not identified      Diameter (mm):  38    Length (mm):  38    Width (mm):  29  Right ovary findings:     Right ovary:  Visualized    Cysts: not identified      Diameter (mm):  41    Length (mm):  40    Width (mm):  28  Other findings:     Free pelvic fluid: not identified      Free peritoneal fluid: not identified    Post-Procedure Details:     Impression:  PCOS    Tolerance:   Tolerated well, no immediate complications

## 2019-01-11 NOTE — PROGRESS NOTES
Ultrasound findings are consistent with probable polycystic ovaries    Will discuss at her visit the end of the month    Thanks

## 2019-01-17 ENCOUNTER — TELEPHONE (OUTPATIENT)
Dept: OBGYN CLINIC | Facility: CLINIC | Age: 29
End: 2019-01-17

## 2019-01-23 ENCOUNTER — OFFICE VISIT (OUTPATIENT)
Dept: OBGYN CLINIC | Facility: CLINIC | Age: 29
End: 2019-01-23
Payer: COMMERCIAL

## 2019-01-23 VITALS
DIASTOLIC BLOOD PRESSURE: 72 MMHG | WEIGHT: 238 LBS | BODY MASS INDEX: 37.35 KG/M2 | HEIGHT: 67 IN | SYSTOLIC BLOOD PRESSURE: 118 MMHG

## 2019-01-23 DIAGNOSIS — E28.2 POLYCYSTIC OVARY SYNDROME: ICD-10-CM

## 2019-01-23 DIAGNOSIS — Z71.9 ENCOUNTER FOR CONSULTATION: Primary | ICD-10-CM

## 2019-01-23 PROCEDURE — 99214 OFFICE O/P EST MOD 30 MIN: CPT | Performed by: OBSTETRICS & GYNECOLOGY

## 2019-01-23 NOTE — PATIENT INSTRUCTIONS
Topic:  Polycystic ovarian syndrome    Patient and I had lengthy discussion regarding her clinical situation    I suggested that she obtain a baseline consultation with the [facility] reproductive group    She is willing to do this    In addition she will obtain the screening labs which I had ordered at her last visit    I will see her back in 4-6 months to regroup and determine what therapeutic options will be best for her at that time    All questions were answered for her at today's visit    Patient will call for any problems issues or concerns that may arise

## 2019-01-23 NOTE — PROGRESS NOTES
Assessment/Plan:      Diagnoses and all orders for this visit:    Encounter for consultation    Polycystic ovary syndrome          Subjective:     Patient ID: Liliana Brandt is a 34 y o  female  Patient is a 72-year-old female who presents today for discussion of polycystic ovarian syndrome    Patient and I had a lengthy discussion regarding polycystic ovaries    Patient already at this time is on metformin for treatment of sugar intolerance    I stated that treatment of this condition depends on whether she wants to have either regular periods and if so a low-dose birth control pill would be appropriate or whether she is considering pregnancy    At this time, I suggested that she make an appointment with the [facility] reproductive group to obtain a consult regarding the condition    In addition I it order to complete blood panel at her last visit and I stated that she should get it at this time so that those labs are available    All questions were answered in detail for her at today's visit    I will see her back in approximately 4-6 months to determine which have an you of treatment we will pursue    As always, I told her to call for any problems issues or concerns that may arise for her        Total time spent at today's visit was 25 minutes of which greater than 50 percent was spent face-to-face counseling and coordination of care        Review of Systems   Constitutional: Negative  HENT: Negative  Eyes: Negative  Respiratory: Negative  Cardiovascular: Negative  Gastrointestinal: Negative  Endocrine: Negative  Followed for sugar intolerance   Genitourinary: Negative  Musculoskeletal: Negative  Skin: Negative  Neurological: Negative  Psychiatric/Behavioral: Negative  Objective:     Physical Exam   Constitutional: She is oriented to person, place, and time  She appears well-developed and well-nourished  Patient is slightly obese   HENT:   Head: Normocephalic  Eyes: Pupils are equal, round, and reactive to light  Neck: Normal range of motion  Pulmonary/Chest: Effort normal    Musculoskeletal: Normal range of motion  Neurological: She is alert and oriented to person, place, and time  Psychiatric: She has a normal mood and affect   Her behavior is normal  Judgment and thought content normal

## 2019-03-08 ENCOUNTER — OFFICE VISIT (OUTPATIENT)
Dept: URGENT CARE | Age: 29
End: 2019-03-08
Payer: COMMERCIAL

## 2019-03-08 VITALS
DIASTOLIC BLOOD PRESSURE: 76 MMHG | WEIGHT: 220 LBS | HEIGHT: 66 IN | TEMPERATURE: 97.7 F | HEART RATE: 76 BPM | SYSTOLIC BLOOD PRESSURE: 136 MMHG | RESPIRATION RATE: 18 BRPM | BODY MASS INDEX: 35.36 KG/M2 | OXYGEN SATURATION: 98 %

## 2019-03-08 DIAGNOSIS — R10.84 GENERALIZED ABDOMINAL PAIN: Primary | ICD-10-CM

## 2019-03-08 PROCEDURE — 99213 OFFICE O/P EST LOW 20 MIN: CPT | Performed by: FAMILY MEDICINE

## 2019-03-09 NOTE — PATIENT INSTRUCTIONS
Patient with abdominal pain that woke her from sleep starting around 3:00 a m  this morning; patient also has back pain; patient states she has decreased appetite and diarrhea which she thinks is from restarting metformin; will send patient to the hospital emergency department by private vehicle for further evaluation and care (patient requests going to [facility])

## 2019-03-09 NOTE — PROGRESS NOTES
[address] [facility]        NAME: Wm Salmon is a 34 y o  female  : 1990    MRN: [id number]  DATE: 2019  TIME: 7:14 PM    Assessment and Plan   Generalized abdominal pain [R10 84]  1  Generalized abdominal pain  Transfer to other facility         Patient Instructions     Patient Instructions   Patient with abdominal pain that woke her from sleep starting around 3:00 a m  this morning; patient also has back pain; patient states she has decreased appetite and diarrhea which she thinks is from restarting metformin; will send patient to the hospital emergency department by private vehicle for further evaluation and care (patient requests going to [facility])  Chief Complaint     Chief Complaint   Patient presents with    Abdominal Pain     pt states bilateral lower quad pain with lower back pain that started suddenly at 0300  History of Present Illness       Patient with abdominal pain that woke her up from her sleep around 3:00 a m  this morning; patient states she also has back pain; patient states she has decreased appetite and diarrhea which she thinks is from restarting metformin; no urinary complaints      Review of Systems   Review of Systems   HENT: Negative  Respiratory: Negative  Cardiovascular: Negative  Gastrointestinal: Positive for abdominal pain and diarrhea  Negative for vomiting  Musculoskeletal: Positive for back pain  Skin: Negative  Neurological: Negative            Current Medications       Current Outpatient Medications:     ALPRAZolam (XANAX) 0 25 mg tablet, Take 0 25 mg by mouth, Disp: , Rfl:     escitalopram (LEXAPRO) 20 mg tablet, Take 20 mg by mouth daily, Disp: , Rfl: 3    metFORMIN (GLUCOPHAGE-XR) 750 mg 24 hr tablet, Take 750 mg by mouth, Disp: , Rfl:     Current Allergies     Allergies as of 2019 - Reviewed 2019   Allergen Reaction Noted    Amoxicillin  2018    Penicillins Hives 09/09/2013            The following portions of the patient's history were reviewed and updated as appropriate: allergies, current medications, past family history, past medical history, past social history, past surgical history and problem list      History reviewed  No pertinent past medical history  History reviewed  No pertinent surgical history  Family History   Problem Relation Age of Onset    Thyroid cancer Mother     Breast cancer additional onset Family     Diabetes Family          Medications have been verified  Objective   /76 (BP Location: Left arm, Patient Position: Sitting)   Pulse 76   Temp 97 7 °F (36 5 °C) (Temporal)   Resp 18   Ht 5' 6" (1 676 m)   Wt 99 8 kg (220 lb)   LMP 03/05/2019   SpO2 98%   BMI 35 51 kg/m²        Physical Exam     Physical Exam   Constitutional: She is oriented to person, place, and time  She appears well-developed and well-nourished  Patient appears uncomfortable   Eyes: Pupils are equal, round, and reactive to light  EOM are normal    Cardiovascular: Normal rate, regular rhythm and normal heart sounds  Pulmonary/Chest: Effort normal and breath sounds normal    Abdominal: Soft  Normal appearance and bowel sounds are normal  There is generalized tenderness  Neurological: She is alert and oriented to person, place, and time  Skin: Skin is warm  Fair color and turgor   Nursing note and vitals reviewed

## 2020-05-27 ENCOUNTER — OFFICE VISIT (OUTPATIENT)
Dept: OBGYN CLINIC | Facility: CLINIC | Age: 30
End: 2020-05-27
Payer: COMMERCIAL

## 2020-05-27 VITALS
SYSTOLIC BLOOD PRESSURE: 118 MMHG | TEMPERATURE: 98.2 F | DIASTOLIC BLOOD PRESSURE: 74 MMHG | HEIGHT: 67 IN | BODY MASS INDEX: 28.25 KG/M2 | WEIGHT: 180 LBS

## 2020-05-27 DIAGNOSIS — E28.2 POLYCYSTIC OVARY SYNDROME: ICD-10-CM

## 2020-05-27 DIAGNOSIS — N93.9 ABNORMAL UTERINE BLEEDING (AUB): Primary | ICD-10-CM

## 2020-05-27 PROCEDURE — 99214 OFFICE O/P EST MOD 30 MIN: CPT | Performed by: OBSTETRICS & GYNECOLOGY

## 2020-05-27 RX ORDER — HYDROXYZINE HYDROCHLORIDE 10 MG/1
TABLET, FILM COATED ORAL
COMMUNITY
Start: 2020-02-26

## 2022-08-31 ENCOUNTER — TELEPHONE (OUTPATIENT)
Dept: OBGYN CLINIC | Facility: CLINIC | Age: 32
End: 2022-08-31

## 2022-08-31 NOTE — TELEPHONE ENCOUNTER
Pt called to schedule new pregnancy appt  She was supposed to be seen at Dr Rustam Henson office today and the told her she couldn't be seen because she has [facility] and she said they were very mean to her and she doesn't want to go back there  She called and had her insurance changed to [facility] which takes effect 9/1/22  Her LMP is 7/2822  Please call to schedule NOB